# Patient Record
Sex: FEMALE | Race: WHITE | NOT HISPANIC OR LATINO
[De-identification: names, ages, dates, MRNs, and addresses within clinical notes are randomized per-mention and may not be internally consistent; named-entity substitution may affect disease eponyms.]

---

## 2017-06-05 ENCOUNTER — APPOINTMENT (OUTPATIENT)
Dept: GYNECOLOGIC ONCOLOGY | Facility: CLINIC | Age: 52
End: 2017-06-05

## 2017-06-07 LAB — HPV HIGH+LOW RISK DNA PNL CVX: NEGATIVE

## 2017-06-14 LAB — PAP TEST: NORMAL

## 2017-06-22 ENCOUNTER — RESULT REVIEW (OUTPATIENT)
Age: 52
End: 2017-06-22

## 2017-07-12 ENCOUNTER — APPOINTMENT (OUTPATIENT)
Dept: GYNECOLOGIC ONCOLOGY | Facility: CLINIC | Age: 52
End: 2017-07-12

## 2017-07-12 VITALS
DIASTOLIC BLOOD PRESSURE: 82 MMHG | HEART RATE: 79 BPM | SYSTOLIC BLOOD PRESSURE: 120 MMHG | BODY MASS INDEX: 23.49 KG/M2 | WEIGHT: 155 LBS | RESPIRATION RATE: 16 BRPM | HEIGHT: 68 IN

## 2017-08-01 LAB — LH SURGICAL PATHOLOGY FINAL REPORT: NORMAL

## 2017-12-11 ENCOUNTER — APPOINTMENT (OUTPATIENT)
Dept: GYNECOLOGIC ONCOLOGY | Facility: CLINIC | Age: 52
End: 2017-12-11
Payer: COMMERCIAL

## 2017-12-11 DIAGNOSIS — K21.9 GASTRO-ESOPHAGEAL REFLUX DISEASE W/OUT ESOPHAGITIS: ICD-10-CM

## 2017-12-11 DIAGNOSIS — Z80.9 FAMILY HISTORY OF MALIGNANT NEOPLASM, UNSPECIFIED: ICD-10-CM

## 2017-12-11 DIAGNOSIS — Z85.850 PERSONAL HISTORY OF MALIGNANT NEOPLASM OF THYROID: ICD-10-CM

## 2017-12-11 DIAGNOSIS — K90.0 CELIAC DISEASE: ICD-10-CM

## 2017-12-11 PROCEDURE — 99214 OFFICE O/P EST MOD 30 MIN: CPT

## 2017-12-13 LAB — HPV HIGH+LOW RISK DNA PNL CVX: NOT DETECTED

## 2017-12-19 LAB — PAP TEST: NORMAL

## 2018-06-11 ENCOUNTER — APPOINTMENT (OUTPATIENT)
Dept: GYNECOLOGIC ONCOLOGY | Facility: CLINIC | Age: 53
End: 2018-06-11
Payer: COMMERCIAL

## 2018-06-11 VITALS
BODY MASS INDEX: 24.44 KG/M2 | SYSTOLIC BLOOD PRESSURE: 122 MMHG | WEIGHT: 161.25 LBS | HEIGHT: 68 IN | HEART RATE: 75 BPM | DIASTOLIC BLOOD PRESSURE: 83 MMHG | OXYGEN SATURATION: 98 %

## 2018-06-11 DIAGNOSIS — R87.618 OTHER ABNORMAL CYTOLOGICAL FINDINGS ON SPECIMENS FROM CERVIX UTERI: ICD-10-CM

## 2018-06-11 PROCEDURE — 99214 OFFICE O/P EST MOD 30 MIN: CPT

## 2018-06-11 PROCEDURE — 88142 CYTOPATH C/V THIN LAYER: CPT

## 2018-06-13 LAB — HPV HIGH+LOW RISK DNA PNL CVX: NOT DETECTED

## 2018-06-21 LAB — PAP TEST: NORMAL

## 2018-06-25 ENCOUNTER — TRANSCRIPTION ENCOUNTER (OUTPATIENT)
Age: 53
End: 2018-06-25

## 2018-07-18 ENCOUNTER — APPOINTMENT (OUTPATIENT)
Dept: GYNECOLOGIC ONCOLOGY | Facility: CLINIC | Age: 53
End: 2018-07-18
Payer: COMMERCIAL

## 2018-07-18 VITALS
DIASTOLIC BLOOD PRESSURE: 77 MMHG | OXYGEN SATURATION: 96 % | WEIGHT: 158 LBS | HEIGHT: 68 IN | BODY MASS INDEX: 23.95 KG/M2 | HEART RATE: 85 BPM | SYSTOLIC BLOOD PRESSURE: 117 MMHG

## 2018-07-18 PROCEDURE — 58100 BIOPSY OF UTERUS LINING: CPT

## 2018-07-18 PROCEDURE — 99214 OFFICE O/P EST MOD 30 MIN: CPT | Mod: 25

## 2018-07-23 LAB — LH SURGICAL PATHOLOGY FINAL REPORT: NORMAL

## 2018-12-03 ENCOUNTER — APPOINTMENT (OUTPATIENT)
Dept: GYNECOLOGIC ONCOLOGY | Facility: CLINIC | Age: 53
End: 2018-12-03
Payer: COMMERCIAL

## 2018-12-03 VITALS
SYSTOLIC BLOOD PRESSURE: 108 MMHG | HEART RATE: 86 BPM | BODY MASS INDEX: 24.25 KG/M2 | WEIGHT: 160 LBS | OXYGEN SATURATION: 97 % | HEIGHT: 68 IN | DIASTOLIC BLOOD PRESSURE: 72 MMHG

## 2018-12-03 PROCEDURE — 99214 OFFICE O/P EST MOD 30 MIN: CPT

## 2019-03-12 ENCOUNTER — APPOINTMENT (OUTPATIENT)
Dept: OPHTHALMOLOGY | Facility: CLINIC | Age: 54
End: 2019-03-12
Payer: COMMERCIAL

## 2019-03-12 DIAGNOSIS — H35.413 LATTICE DEGENERATION OF RETINA, BILATERAL: ICD-10-CM

## 2019-03-12 PROCEDURE — 92014 COMPRE OPH EXAM EST PT 1/>: CPT

## 2019-04-26 ENCOUNTER — APPOINTMENT (OUTPATIENT)
Dept: OPHTHALMOLOGY | Facility: CLINIC | Age: 54
End: 2019-04-26

## 2019-05-30 NOTE — PAST MEDICAL HISTORY
[Perimenopausal] : The patient is perimenopausal [Menarche Age ____] : age at menarche was [unfilled] [Definite ___ (Date)] : the last menstrual period was [unfilled] [Total Preg ___] : G[unfilled] [Live Births ___] : P[unfilled]  [Full Term ___] : Full Term: [unfilled] [Abortions ___] : Abortions:[unfilled] [Living ___] : Living: [unfilled]

## 2019-05-30 NOTE — PHYSICAL EXAM
[Abnormal] : Examination of breasts: Abnormal [] : implants [No Masses] : no breast masses were palpable [Normal] : Recto-Vaginal Exam: Normal [Fully active, able to carry on all pre-disease performance without restriction] : Status 0 - Fully active, able to carry on all pre-disease performance without restriction

## 2019-06-03 ENCOUNTER — APPOINTMENT (OUTPATIENT)
Dept: GYNECOLOGIC ONCOLOGY | Facility: CLINIC | Age: 54
End: 2019-06-03
Payer: COMMERCIAL

## 2019-06-03 VITALS
SYSTOLIC BLOOD PRESSURE: 112 MMHG | HEART RATE: 74 BPM | WEIGHT: 155.13 LBS | DIASTOLIC BLOOD PRESSURE: 74 MMHG | BODY MASS INDEX: 23.51 KG/M2 | OXYGEN SATURATION: 96 % | HEIGHT: 68 IN

## 2019-06-03 DIAGNOSIS — K64.9 UNSPECIFIED HEMORRHOIDS: ICD-10-CM

## 2019-06-03 PROCEDURE — 99214 OFFICE O/P EST MOD 30 MIN: CPT

## 2019-06-05 LAB — HPV HIGH+LOW RISK DNA PNL CVX: NOT DETECTED

## 2019-06-14 ENCOUNTER — APPOINTMENT (OUTPATIENT)
Dept: INFECTIOUS DISEASE | Facility: CLINIC | Age: 54
End: 2019-06-14
Payer: COMMERCIAL

## 2019-06-14 VITALS
WEIGHT: 155 LBS | OXYGEN SATURATION: 99 % | SYSTOLIC BLOOD PRESSURE: 131 MMHG | DIASTOLIC BLOOD PRESSURE: 83 MMHG | HEART RATE: 69 BPM | HEIGHT: 68 IN | BODY MASS INDEX: 23.49 KG/M2 | TEMPERATURE: 97.9 F | RESPIRATION RATE: 14 BRPM

## 2019-06-14 DIAGNOSIS — Z20.820 CONTACT WITH AND (SUSPECTED) EXPOSURE TO VARICELLA: ICD-10-CM

## 2019-06-14 PROCEDURE — 99203 OFFICE O/P NEW LOW 30 MIN: CPT

## 2019-06-14 RX ORDER — PRAMOXINE HYDROCHLORIDE HYDROCORTISONE ACETATE 100; 100 MG/10G; MG/10G
1-1 AEROSOL, FOAM TOPICAL
Qty: 1 | Refills: 6 | Status: COMPLETED | COMMUNITY
Start: 2017-06-05 | End: 2019-06-14

## 2019-06-14 NOTE — REVIEW OF SYSTEMS
[Fever] : no fever [Eye Pain] : no eye pain [Nasal Discharge] : no nasal discharge [Chills] : no chills [Sore Throat] : no sore throat [Chest Pain] : no chest pain [Shortness Of Breath] : no shortness of breath [Palpitations] : no palpitations [Sputum] : not coughing up ~M sputum [Dysuria] : no dysuria [Cough] : no cough [FreeTextEntry7] : Recent diarrheal illness due to Cryptosporidium - still with lactose intolerance below baseline. [Skin Lesions] : no skin lesions

## 2019-06-14 NOTE — HISTORY OF PRESENT ILLNESS
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  She came to discuss zoster vaccine.  Her 28 yo MA had zoster on her arm - she is afraid that she will contract zoster, though she understands that the pathogenesis involves reactivation.  She has had a variety of autoimmune processes.  She is under the care of Dr. Chin Agarwal ([facility] [place]), an expert in celiac disease, who believes there is a link between Hashimotos/papillary CA of thyroid, breast CA and celiac disease.  She is concerned about timing of vaccine and current lack of knowledge regarding duration of immunity/how to maintain immunity.  She had varicella at age 6 months, had varicella titers when tested remotely.

## 2019-06-14 NOTE — ASSESSMENT
[FreeTextEntry1] : 52 yo F seen for counseling regarding Shingrix.  We reviewed the nature of the vaccine, incidence of zoster at varying ages, vaccine efficacy and side effects.  She will defer at this time and discuss with Dr. Agarwal.

## 2019-07-17 NOTE — DISCUSSION/SUMMARY
[FreeTextEntry1] : Stable thickened endometrium without bleeding\par Follow up in 6 months\par Pap/HPV done today

## 2019-07-17 NOTE — HISTORY OF PRESENT ILLNESS
[FreeTextEntry1] : Problem\par 1) Breast Cancer\par 2) HPV+\par 3) BRCA negative\par 4)THickened Endometrium\par \par Previous Therapy\par 1)Bilateral Mastectomy\par 2) Tamoxifen\par 3) pelvic Ultrasound 8/2016\par    a) normal ovaries, stable intramural fibroids\par 4) Pelvic ultrasound 5/26/17\par    a) uterus with 2 small fibroids, unremarkable endometrium and bilateral adnexa\par 5) pap 6/2017 NILM but with endometrial cells present\par 6) EMB 7/12/17 weakly proliferative endometrium\par 7)Pap 12/11/2017\par    a)NILM, HPV/HR Negative\par 8)Pap 6/11/18\par    a)NILM,HPV/HR Neg\par 9)TVUS 6/22/18\par    a)Small amount of fluid in endometrial cavity\par    b)Thickened endometrium of 7mm\par    c)Left adnexal simple ovoid cyst of 4x2x3.5cm\par 10) US 11/27/18\par    a)Endometrium 10mm\par    b)Fibroid stable of 2.9cm\par    c)Left ovarian cyst no longer seen\par \par Patient presents today for a follow up.   \par \par Health Maintenance\par Bone Density at [facility]\par Pap 6/2018 Neg

## 2019-07-17 NOTE — REVIEW OF SYSTEMS
[Negative] : Musculoskeletal [Dyspareunia] : dyspareunia [Normal Sexual Function] : normal sexual function [FreeTextEntry4] : pain with rorgasm in right lower quadrant, decreased libido on tamoxifen. 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

## 2019-11-04 VITALS
TEMPERATURE: 98 F | WEIGHT: 154.54 LBS | HEIGHT: 68 IN | OXYGEN SATURATION: 98 % | RESPIRATION RATE: 16 BRPM | SYSTOLIC BLOOD PRESSURE: 100 MMHG | HEART RATE: 86 BPM | DIASTOLIC BLOOD PRESSURE: 67 MMHG

## 2019-11-04 NOTE — ASU PREOP CHECKLIST - 1.
pt refused [facility] pregnancy testing and T&S blood draw. pt was informed of the importance of testing however verbalized understanding but does not agree with [facility] policy and is stiff refusing.  gyn team notified, surgeon aware- no further orders given.

## 2019-11-04 NOTE — ASU PATIENT PROFILE, ADULT - PSH
History of mastectomy, bilateral    History of thyroidectomy History of mastectomy, bilateral    History of nasal surgery    History of thyroidectomy

## 2019-11-04 NOTE — ASU PATIENT PROFILE, ADULT - PMH
Abnormal uterine and vaginal bleeding Abnormal uterine and vaginal bleeding    Breast CA    Celiac disease    Thyroid ca

## 2019-11-04 NOTE — ASU PATIENT PROFILE, ADULT - PROVIDER NOTIFICATION
"""Annual Type 2 Diabetic eye exam with dilation. No diabetic retinopathy found. Recommend annual dilated examinations. Patient instructed to call office immediately if sudden changes in vision occur. Emphasized importance of good blood glucose control. Summary of care provided to the physician managing the ongoing diabetes care. """ Declines

## 2019-11-05 ENCOUNTER — APPOINTMENT (OUTPATIENT)
Dept: GYNECOLOGIC ONCOLOGY | Facility: HOSPITAL | Age: 54
End: 2019-11-05

## 2019-11-05 ENCOUNTER — OUTPATIENT (OUTPATIENT)
Dept: INPATIENT UNIT | Facility: HOSPITAL | Age: 54
LOS: 1 days | Discharge: HOME | End: 2019-11-05
Payer: COMMERCIAL

## 2019-11-05 ENCOUNTER — RESULT REVIEW (OUTPATIENT)
Age: 54
End: 2019-11-05

## 2019-11-05 VITALS
HEART RATE: 92 BPM | OXYGEN SATURATION: 83 % | DIASTOLIC BLOOD PRESSURE: 79 MMHG | RESPIRATION RATE: 20 BRPM | SYSTOLIC BLOOD PRESSURE: 119 MMHG | TEMPERATURE: 97 F

## 2019-11-05 DIAGNOSIS — Z90.13 ACQUIRED ABSENCE OF BILATERAL BREASTS AND NIPPLES: Chronic | ICD-10-CM

## 2019-11-05 DIAGNOSIS — Z98.890 OTHER SPECIFIED POSTPROCEDURAL STATES: Chronic | ICD-10-CM

## 2019-11-05 DIAGNOSIS — Z90.09 ACQUIRED ABSENCE OF OTHER PART OF HEAD AND NECK: Chronic | ICD-10-CM

## 2019-11-05 PROCEDURE — 58558 HYSTEROSCOPY BIOPSY: CPT

## 2019-11-05 PROCEDURE — 88305 TISSUE EXAM BY PATHOLOGIST: CPT

## 2019-11-05 RX ORDER — ONDANSETRON 8 MG/1
4 TABLET, FILM COATED ORAL ONCE
Refills: 0 | Status: DISCONTINUED | OUTPATIENT
Start: 2019-11-05 | End: 2019-11-05

## 2019-11-05 RX ORDER — METOCLOPRAMIDE HCL 10 MG
10 TABLET ORAL EVERY 6 HOURS
Refills: 0 | Status: DISCONTINUED | OUTPATIENT
Start: 2019-11-05 | End: 2019-11-05

## 2019-11-05 RX ORDER — KETOROLAC TROMETHAMINE 30 MG/ML
30 SYRINGE (ML) INJECTION ONCE
Refills: 0 | Status: DISCONTINUED | OUTPATIENT
Start: 2019-11-05 | End: 2019-11-05

## 2019-11-05 NOTE — BRIEF OPERATIVE NOTE - NSICDXBRIEFPROCEDURE_GEN_ALL_CORE_FT
PROCEDURES:  Dilation and curettage, uterus 05-Nov-2019 18:14:59  Coleen Ravi  Hysteroscopy 05-Nov-2019 18:14:50  Coleen Ravi

## 2019-11-06 LAB — SURGICAL PATHOLOGY STUDY: SIGNIFICANT CHANGE UP

## 2019-11-14 PROBLEM — C73 MALIGNANT NEOPLASM OF THYROID GLAND: Chronic | Status: ACTIVE | Noted: 2019-11-04

## 2019-11-14 PROBLEM — K90.0 CELIAC DISEASE: Chronic | Status: ACTIVE | Noted: 2019-11-04

## 2019-11-14 PROBLEM — C50.919 MALIGNANT NEOPLASM OF UNSPECIFIED SITE OF UNSPECIFIED FEMALE BREAST: Chronic | Status: ACTIVE | Noted: 2019-11-04

## 2019-11-14 PROBLEM — N93.9 ABNORMAL UTERINE AND VAGINAL BLEEDING, UNSPECIFIED: Chronic | Status: ACTIVE | Noted: 2019-11-04

## 2019-12-04 ENCOUNTER — APPOINTMENT (OUTPATIENT)
Dept: GYNECOLOGIC ONCOLOGY | Facility: CLINIC | Age: 54
End: 2019-12-04

## 2020-02-01 ENCOUNTER — TRANSCRIPTION ENCOUNTER (OUTPATIENT)
Age: 55
End: 2020-02-01

## 2020-02-01 NOTE — PHYSICAL EXAM
[] : implants [Abnormal] : Examination of breasts: Abnormal [No Masses] : no breast masses were palpable [Normal] : Recto-Vaginal Exam: Normal [Fully active, able to carry on all pre-disease performance without restriction] : Status 0 - Fully active, able to carry on all pre-disease performance without restriction

## 2020-02-01 NOTE — PAST MEDICAL HISTORY
[Menarche Age ____] : age at menarche was [unfilled] [Perimenopausal] : The patient is perimenopausal [Definite ___ (Date)] : the last menstrual period was [unfilled] [Total Preg ___] : G[unfilled] [Live Births ___] : P[unfilled]  [Full Term ___] : Full Term: [unfilled] [Abortions ___] : Abortions:[unfilled] [Living ___] : Living: [unfilled]

## 2020-02-03 ENCOUNTER — APPOINTMENT (OUTPATIENT)
Dept: GYNECOLOGIC ONCOLOGY | Facility: CLINIC | Age: 55
End: 2020-02-03
Payer: COMMERCIAL

## 2020-02-03 VITALS
HEIGHT: 68 IN | HEART RATE: 69 BPM | OXYGEN SATURATION: 95 % | BODY MASS INDEX: 23.34 KG/M2 | SYSTOLIC BLOOD PRESSURE: 128 MMHG | DIASTOLIC BLOOD PRESSURE: 81 MMHG | WEIGHT: 154 LBS

## 2020-02-03 PROCEDURE — 99214 OFFICE O/P EST MOD 30 MIN: CPT

## 2020-02-04 NOTE — HISTORY OF PRESENT ILLNESS
[FreeTextEntry1] : Problem\par 1) Breast Cancer\par 2) HPV+\par 3) BRCA negative\par 4)THickened Endometrium\par \par Previous Therapy\par 1)Bilateral Mastectomy\par 2) Tamoxifen\par 3) pelvic Ultrasound 8/2016\par    a) normal ovaries, stable intramural fibroids\par 4) Pelvic ultrasound 5/26/17\par    a) uterus with 2 small fibroids, unremarkable endometrium and bilateral adnexa\par 5) pap 6/2017 NILM but with endometrial cells present\par 6) EMB 7/12/17 weakly proliferative endometrium\par 7)Pap 12/11/2017\par    a)NILM, HPV/HR Negative\par 8)Pap 6/11/18\par    a)NILM,HPV/HR Neg\par 9)TVUS 6/22/18\par    a)Small amount of fluid in endometrial cavity\par    b)Thickened endometrium of 7mm\par    c)Left adnexal simple ovoid cyst of 4x2x3.5cm\par 10) US 11/27/18\par    a)Endometrium 10mm\par    b)Fibroid stable of 2.9cm\par    c)Left ovarian cyst no longer seen\par 11) US 11/2019 thickened endometrium - done for PMB\par 12) D&C 12/11/19\par    a) benign endometrial and endocervical polyps, reviewed at [facility] at patient request\par 13) Pelvic US 1/2020\par     a) Endometrium 5mm, Uterus 10/1cm - small 1.6 and 0.9cm fibroids\par \par Patient here for follow up. Feeling well, no further bleeding. \par \par Health Maintenance\par Bone Density at [facility]\par Pap 6/2018 Neg

## 2020-02-04 NOTE — DISCUSSION/SUMMARY
[FreeTextEntry1] : D&C with benign endometrial polyp\par No bleeding and normal US now\par Follow up in 6months

## 2020-02-13 NOTE — ASU PREOP CHECKLIST - TO WHOM
Detail Level: Generalized Dapsone Counseling: I discussed with the patient the risks of dapsone including but not limited to hemolytic anemia, agranulocytosis, rashes, methemoglobinemia, kidney failure, peripheral neuropathy, headaches, GI upset, and liver toxicity.  Patients who start dapsone require monitoring including baseline LFTs and weekly CBCs for the first month, then every month thereafter.  The patient verbalized understanding of the proper use and possible adverse effects of dapsone.  All of the patient's questions and concerns were addressed. Isotretinoin Counseling: Patient should get monthly blood tests, not donate blood, not drive at night if vision affected, not share medication, and not undergo elective surgery for 6 months after tx completed. Side effects reviewed, pt to contact office should one occur. Tazorac Counseling:  Patient advised that medication is irritating and drying.  Patient may need to apply sparingly and wash off after an hour before eventually leaving it on overnight.  The patient verbalized understanding of the proper use and possible adverse effects of tazorac.  All of the patient's questions and concerns were addressed. Spironolactone Counseling: Patient advised regarding risks of diarrhea, abdominal pain, hyperkalemia, birth defects (for female patients), liver toxicity and renal toxicity. The patient may need blood work to monitor liver and kidney function and potassium levels while on therapy. The patient verbalized understanding of the proper use and possible adverse effects of spironolactone.  All of the patient's questions and concerns were addressed. Erythromycin Pregnancy And Lactation Text: This medication is Pregnancy Category B and is considered safe during pregnancy. It is also excreted in breast milk. Spironolactone Pregnancy And Lactation Text: This medication can cause feminization of the male fetus and should be avoided during pregnancy. The active metabolite is also found in breast milk. Minocycline Pregnancy And Lactation Text: This medication is Pregnancy Category D and not consider safe during pregnancy. It is also excreted in breast milk. Doxycycline Pregnancy And Lactation Text: This medication is Pregnancy Category D and not consider safe during pregnancy. It is also excreted in breast milk but is considered safe for shorter treatment courses. Benzoyl Peroxide Pregnancy And Lactation Text: This medication is Pregnancy Category C. It is unknown if benzoyl peroxide is excreted in breast milk. Include Pregnancy/Lactation Warning?: No Doxycycline Counseling:  Patient counseled regarding possible photosensitivity and increased risk for sunburn.  Patient instructed to avoid sunlight, if possible.  When exposed to sunlight, patients should wear protective clothing, sunglasses, and sunscreen.  The patient was instructed to call the office immediately if the following severe adverse effects occur:  hearing changes, easy bruising/bleeding, severe headache, or vision changes.  The patient verbalized understanding of the proper use and possible adverse effects of doxycycline.  All of the patient's questions and concerns were addressed. Isotretinoin Pregnancy And Lactation Text: This medication is Pregnancy Category X and is considered extremely dangerous during pregnancy. It is unknown if it is excreted in breast milk. High Dose Vitamin A Pregnancy And Lactation Text: High dose vitamin A therapy is contraindicated during pregnancy and breast feeding. Bactrim Counseling:  I discussed with the patient the risks of sulfa antibiotics including but not limited to GI upset, allergic reaction, drug rash, diarrhea, dizziness, photosensitivity, and yeast infections.  Rarely, more serious reactions can occur including but not limited to aplastic anemia, agranulocytosis, methemoglobinemia, blood dyscrasias, liver or kidney failure, lung infiltrates or desquamative/blistering drug rashes. Dapsone Pregnancy And Lactation Text: This medication is Pregnancy Category C and is not considered safe during pregnancy or breast feeding. Birth Control Pills Pregnancy And Lactation Text: This medication should be avoided if pregnant and for the first 30 days post-partum. Topical Sulfur Applications Counseling: Topical Sulfur Counseling: Patient counseled that this medication may cause skin irritation or allergic reactions.  In the event of skin irritation, the patient was advised to reduce the amount of the drug applied or use it less frequently.   The patient verbalized understanding of the proper use and possible adverse effects of topical sulfur application.  All of the patient's questions and concerns were addressed. Azithromycin Pregnancy And Lactation Text: This medication is considered safe during pregnancy and is also secreted in breast milk. Detail Level: Detailed Benzoyl Peroxide Counseling: Patient counseled that medicine may cause skin irritation and bleach clothing.  In the event of skin irritation, the patient was advised to reduce the amount of the drug applied or use it less frequently.   The patient verbalized understanding of the proper use and possible adverse effects of benzoyl peroxide.  All of the patient's questions and concerns were addressed. Detail Level: Zone Topical Retinoid counseling:  Patient advised to apply a pea-sized amount only at bedtime and wait 30 minutes after washing their face before applying.  If too drying, patient may add a non-comedogenic moisturizer. The patient verbalized understanding of the proper use and possible adverse effects of retinoids.  All of the patient's questions and concerns were addressed. Tetracycline Counseling: Patient counseled regarding possible photosensitivity and increased risk for sunburn.  Patient instructed to avoid sunlight, if possible.  When exposed to sunlight, patients should wear protective clothing, sunglasses, and sunscreen.  The patient was instructed to call the office immediately if the following severe adverse effects occur:  hearing changes, easy bruising/bleeding, severe headache, or vision changes.  The patient verbalized understanding of the proper use and possible adverse effects of tetracycline.  All of the patient's questions and concerns were addressed. Patient understands to avoid pregnancy while on therapy due to potential birth defects. Topical Retinoid Pregnancy And Lactation Text: This medication is Pregnancy Category C. It is unknown if this medication is excreted in breast milk. Bactrim Pregnancy And Lactation Text: This medication is Pregnancy Category D and is known to cause fetal risk.  It is also excreted in breast milk. High Dose Vitamin A Counseling: Side effects reviewed, pt to contact office should one occur. Topical Sulfur Applications Pregnancy And Lactation Text: This medication is Pregnancy Category C and has an unknown safety profile during pregnancy. It is unknown if this topical medication is excreted in breast milk. Birth Control Pills Counseling: Birth Control Pill Counseling: I discussed with the patient the potential side effects of OCPs including but not limited to increased risk of stroke, heart attack, thrombophlebitis, deep venous thrombosis, hepatic adenomas, breast changes, GI upset, headaches, and depression.  The patient verbalized understanding of the proper use and possible adverse effects of OCPs. All of the patient's questions and concerns were addressed. Erythromycin Counseling:  I discussed with the patient the risks of erythromycin including but not limited to GI upset, allergic reaction, drug rash, diarrhea, increase in liver enzymes, and yeast infections. Topical Clindamycin Pregnancy And Lactation Text: This medication is Pregnancy Category B and is considered safe during pregnancy. It is unknown if it is excreted in breast milk. Tazorac Pregnancy And Lactation Text: This medication is not safe during pregnancy. It is unknown if this medication is excreted in breast milk. Detail Level: Simple Minocycline Counseling: Patient advised regarding possible photosensitivity and discoloration of the teeth, skin, lips, tongue and gums.  Patient instructed to avoid sunlight, if possible.  When exposed to sunlight, patients should wear protective clothing, sunglasses, and sunscreen.  The patient was instructed to call the office immediately if the following severe adverse effects occur:  hearing changes, easy bruising/bleeding, severe headache, or vision changes.  The patient verbalized understanding of the proper use and possible adverse effects of minocycline.  All of the patient's questions and concerns were addressed. Topical Clindamycin Counseling: Patient counseled that this medication may cause skin irritation or allergic reactions.  In the event of skin irritation, the patient was advised to reduce the amount of the drug applied or use it less frequently.   The patient verbalized understanding of the proper use and possible adverse effects of clindamycin.  All of the patient's questions and concerns were addressed. Azithromycin Counseling:  I discussed with the patient the risks of azithromycin including but not limited to GI upset, allergic reaction, drug rash, diarrhea, and yeast infections. OR RN

## 2020-05-30 ENCOUNTER — MESSAGE (OUTPATIENT)
Age: 55
End: 2020-05-30

## 2020-05-30 ENCOUNTER — APPOINTMENT (OUTPATIENT)
Dept: DISASTER EMERGENCY | Facility: HOSPITAL | Age: 55
End: 2020-05-30

## 2020-08-10 ENCOUNTER — APPOINTMENT (OUTPATIENT)
Dept: GYNECOLOGIC ONCOLOGY | Facility: CLINIC | Age: 55
End: 2020-08-10

## 2020-08-10 ENCOUNTER — APPOINTMENT (OUTPATIENT)
Dept: GYNECOLOGIC ONCOLOGY | Facility: CLINIC | Age: 55
End: 2020-08-10
Payer: COMMERCIAL

## 2020-08-10 PROCEDURE — 99214 OFFICE O/P EST MOD 30 MIN: CPT | Mod: 95

## 2020-08-10 NOTE — HISTORY OF PRESENT ILLNESS
[FreeTextEntry1] : Problem\par 1) Breast Cancer\par 2) HPV+\par 3) BRCA negative\par 4)THickened Endometrium\par \par Previous Therapy\par 1)Bilateral Mastectomy\par 2) Tamoxifen\par 3) pelvic Ultrasound 8/2016\par    a) normal ovaries, stable intramural fibroids\par 4) Pelvic ultrasound 5/26/17\par    a) uterus with 2 small fibroids, unremarkable endometrium and bilateral adnexa\par 5) pap 6/2017 NILM but with endometrial cells present\par 6) EMB 7/12/17 weakly proliferative endometrium\par 7)Pap 12/11/2017\par    a)NILM, HPV/HR Negative\par 8)Pap 6/11/18\par    a)NILM,HPV/HR Neg\par 9)TVUS 6/22/18\par    a)Small amount of fluid in endometrial cavity\par    b)Thickened endometrium of 7mm\par    c)Left adnexal simple ovoid cyst of 4x2x3.5cm\par 10) US 11/27/18\par    a)Endometrium 10mm\par    b)Fibroid stable of 2.9cm\par    c)Left ovarian cyst no longer seen\par 11) US 11/2019 thickened endometrium - done for PMB\par 12) D&C 12/11/19\par    a) benign endometrial and endocervical polyps, reviewed at [facility] at patient request\par 13) Pelvic US 1/2020\par     a) Endometrium 5mm, Uterus 10/1cm - small 1.6 and 0.9cm fibroids\par 14) Pelvic US 8/3/2020\par    a) US 9.7cm, stable small intramural fibroids. endometrium is mildly thickened 9mm with small submucosal cysts. Small simple  1.7cm R ovarian cyst\par \par Patient here for follow up, done via telehealth due to COVID19 restrictions. Telehealth consent obtained. . Feeling well, no further bleeding. \par \par Health Maintenance\par Bone Density at [facility] [place]\par Pap 6/2018 Neg

## 2020-08-10 NOTE — DISCUSSION/SUMMARY
[FreeTextEntry1] : Thickened endometrium with subendometriotic cysts\par patient not bleeding, no longer on Tamoxifen\par Discussed need for EMB, will follow up Monday 8/17

## 2020-08-10 NOTE — REVIEW OF SYSTEMS
[FreeTextEntry4] : pain with rorgasm in right lower quadrant, decreased libido on tamoxifen. 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

## 2020-08-14 NOTE — PAST MEDICAL HISTORY
[Perimenopausal] : The patient is perimenopausal [Menarche Age ____] : age at menarche was [unfilled] [Total Preg ___] : G[unfilled] [Definite ___ (Date)] : the last menstrual period was [unfilled] [Live Births ___] : P[unfilled]  [Abortions ___] : Abortions:[unfilled] [Full Term ___] : Full Term: [unfilled] [Living ___] : Living: [unfilled]

## 2020-08-17 ENCOUNTER — APPOINTMENT (OUTPATIENT)
Dept: GYNECOLOGIC ONCOLOGY | Facility: CLINIC | Age: 55
End: 2020-08-17
Payer: COMMERCIAL

## 2020-08-17 VITALS
BODY MASS INDEX: 23.04 KG/M2 | DIASTOLIC BLOOD PRESSURE: 82 MMHG | SYSTOLIC BLOOD PRESSURE: 124 MMHG | HEART RATE: 70 BPM | OXYGEN SATURATION: 96 % | WEIGHT: 152 LBS | HEIGHT: 68 IN

## 2020-08-17 DIAGNOSIS — N95.1 MENOPAUSAL AND FEMALE CLIMACTERIC STATES: ICD-10-CM

## 2020-08-17 PROCEDURE — 58100 BIOPSY OF UTERUS LINING: CPT

## 2020-08-17 NOTE — DISCUSSION/SUMMARY
[FreeTextEntry1] : EMB performed today\par will call with results to determine plan of care\par Repeat US in 6 months

## 2020-08-17 NOTE — HISTORY OF PRESENT ILLNESS
[FreeTextEntry1] : Problem\par 1) Breast Cancer\par 2) HPV+\par 3) BRCA negative\par 4)THickened Endometrium\par \par Previous Therapy\par 1)Bilateral Mastectomy\par 2) Tamoxifen\par 3) pelvic Ultrasound 8/2016\par    a) normal ovaries, stable intramural fibroids\par 4) Pelvic ultrasound 5/26/17\par    a) uterus with 2 small fibroids, unremarkable endometrium and bilateral adnexa\par 5) pap 6/2017 NILM but with endometrial cells present\par 6) EMB 7/12/17 weakly proliferative endometrium\par 7)Pap 12/11/2017\par    a)NILM, HPV/HR Negative\par 8)Pap 6/11/18\par    a)NILM,HPV/HR Neg\par 9)TVUS 6/22/18\par    a)Small amount of fluid in endometrial cavity\par    b)Thickened endometrium of 7mm\par    c)Left adnexal simple ovoid cyst of 4x2x3.5cm\par 10) US 11/27/18\par    a)Endometrium 10mm\par    b)Fibroid stable of 2.9cm\par    c)Left ovarian cyst no longer seen\par 11) US 11/2019 thickened endometrium - done for PMB\par 12) D&C 12/11/19\par    a) benign endometrial and endocervical polyps, reviewed at [facility] at patient request\par 13) Pelvic US 1/2020\par     a) Endometrium 5mm, Uterus 10/1cm - small 1.6 and 0.9cm fibroids\par 14) Pelvic US 8/3/2020\par    a) US 9.7cm, stable small intramural fibroids. endometrium is mildly thickened 9mm with small submucosal cysts. Small simple  1.7cm R ovarian cyst\par \par Here today for EMB\par \par Health Maintenance\par Bone Density at [facility] [place]\par Pap 6/2018 Neg

## 2020-08-17 NOTE — REVIEW OF SYSTEMS
[Negative] : Musculoskeletal [Normal Sexual Function] : normal sexual function [Dyspareunia] : dyspareunia [FreeTextEntry4] : pain with rorgasm in right lower quadrant, decreased libido on tamoxifen. 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

## 2020-08-17 NOTE — PROCEDURE
[Endometrial Biopsy] : an endometrial biopsy [Patient] : the patient [Thickened Endometrium] : thickened endometrium [None] : none [Verbal Consent] : verbal consent was obtained prior to the procedure and is detailed in the patient's record [No Complications] : none [Yes] : the specimen was sent to pathology [Tolerated Well] : the patient tolerated the procedure well [FreeTextEntry1] : Uterus sounded to 7cm, adequate tissue obtained

## 2021-04-19 ENCOUNTER — NON-APPOINTMENT (OUTPATIENT)
Age: 56
End: 2021-04-19

## 2021-04-19 ENCOUNTER — APPOINTMENT (OUTPATIENT)
Dept: GYNECOLOGIC ONCOLOGY | Facility: CLINIC | Age: 56
End: 2021-04-19
Payer: COMMERCIAL

## 2021-04-19 VITALS
OXYGEN SATURATION: 99 % | TEMPERATURE: 97.8 F | DIASTOLIC BLOOD PRESSURE: 85 MMHG | BODY MASS INDEX: 22.28 KG/M2 | HEART RATE: 60 BPM | WEIGHT: 147 LBS | HEIGHT: 68 IN | SYSTOLIC BLOOD PRESSURE: 145 MMHG

## 2021-04-19 PROCEDURE — 99072 ADDL SUPL MATRL&STAF TM PHE: CPT

## 2021-04-19 PROCEDURE — 99214 OFFICE O/P EST MOD 30 MIN: CPT

## 2021-04-19 NOTE — PROCEDURE
[Endometrial Biopsy] : an endometrial biopsy [Thickened Endometrium] : thickened endometrium [Patient] : the patient [Verbal Consent] : verbal consent was obtained prior to the procedure and is detailed in the patient's record [None] : none [Yes] : the specimen was sent to pathology [No Complications] : none [Tolerated Well] : the patient tolerated the procedure well [FreeTextEntry1] : Uterus sounded to 7cm, adequate tissue obtained

## 2021-04-19 NOTE — HISTORY OF PRESENT ILLNESS
[FreeTextEntry1] : Problem\par 1) Breast Cancer\par 2) HPV+\par 3) BRCA negative\par 4)THickened Endometrium\par \par Previous Therapy\par 1)Bilateral Mastectomy\par 2) Tamoxifen\par 3) pelvic Ultrasound 8/2016\par    a) normal ovaries, stable intramural fibroids\par 4) Pelvic ultrasound 5/26/17\par    a) uterus with 2 small fibroids, unremarkable endometrium and bilateral adnexa\par 5) pap 6/2017 NILM but with endometrial cells present\par 6) EMB 7/12/17 weakly proliferative endometrium\par 7)Pap 12/11/2017\par    a)NILM, HPV/HR Negative\par 8)Pap 6/11/18\par    a)NILM,HPV/HR Neg\par 9)TVUS 6/22/18\par    a)Small amount of fluid in endometrial cavity\par    b)Thickened endometrium of 7mm\par    c)Left adnexal simple ovoid cyst of 4x2x3.5cm\par 10) US 11/27/18\par    a)Endometrium 10mm\par    b)Fibroid stable of 2.9cm\par    c)Left ovarian cyst no longer seen\par 11) US 11/2019 thickened endometrium - done for PMB\par 12) D&C 12/11/19\par    a) benign endometrial and endocervical polyps, reviewed at [facility] at patient request\par 13) Pelvic US 1/2020\par     a) Endometrium 5mm, Uterus 10/1cm - small 1.6 and 0.9cm fibroids\par 14) Pelvic US 8/3/2020\par    a) US 9.7cm, stable small intramural fibroids. endometrium is mildly thickened 9mm with small submucosal cysts. Small simple  1.7cm R ovarian cyst\par 15) EMB 8/2020 weakly proliferative endometrium\par \par Here for 6 month follow up. Feeling well. \par \par Health Maintenance\par Bone Density at [facility] [place]\par Pap 6/2018 Neg

## 2021-04-19 NOTE — REVIEW OF SYSTEMS
[Negative] : Musculoskeletal [Dyspareunia] : dyspareunia [Normal Sexual Function] : normal sexual function [FreeTextEntry4] : pain with orgasm in right lower quadrant, decreased libido on tamoxifen. 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

## 2021-04-19 NOTE — PHYSICAL EXAM
[Fully active, able to carry on all pre-disease performance without restriction] : Status 0 - Fully active, able to carry on all pre-disease performance without restriction [Normal] : Breasts: Normal [de-identified] : Breast implants and tattoo.  No masses in axilla and no overlying skin changes

## 2021-04-19 NOTE — DISCUSSION/SUMMARY
[Reviewed Clinical Lab Test(s)] : Results of clinical tests were reviewed. [FreeTextEntry1] : We will follow-up with the results of her transvaginal ultrasound that she had a few days ago\par Her bone density continues to be osteopenic\par I recommend she go on calcium and vitamin D but feels that her serum calcium is occasionally elevated with an unknown cause that she does not want to do that \par She will continue to exercise and at the patient's request I will see her back in 6 months \par Repeat US in 6 months

## 2021-09-26 ENCOUNTER — LABORATORY RESULT (OUTPATIENT)
Age: 56
End: 2021-09-26

## 2021-09-27 ENCOUNTER — APPOINTMENT (OUTPATIENT)
Dept: GYNECOLOGIC ONCOLOGY | Facility: CLINIC | Age: 56
End: 2021-09-27
Payer: COMMERCIAL

## 2021-09-27 VITALS
OXYGEN SATURATION: 100 % | WEIGHT: 120 LBS | RESPIRATION RATE: 18 BRPM | SYSTOLIC BLOOD PRESSURE: 120 MMHG | HEART RATE: 71 BPM | TEMPERATURE: 97.7 F | DIASTOLIC BLOOD PRESSURE: 79 MMHG | HEIGHT: 68 IN | BODY MASS INDEX: 18.19 KG/M2

## 2021-09-27 PROCEDURE — 99212 OFFICE O/P EST SF 10 MIN: CPT | Mod: 25

## 2021-09-27 NOTE — ASSESSMENT
[FreeTextEntry1] : [ ] Pap Smear performed today\par [ ] patient to obtain ultrasound this week- will obtain\par [ ] patient tro return in 1 year for followup- patient requesting to come back in 6 months

## 2021-09-27 NOTE — HISTORY OF PRESENT ILLNESS
[FreeTextEntry1] : Problem\par 1) Breast Cancer\par 2) HPV+\par 3) BRCA negative\par 4)THickened Endometrium\par \par Previous Therapy\par 1)Bilateral Mastectomy\par 2) Tamoxifen\par 3) pelvic Ultrasound 8/2016\par    a) normal ovaries, stable intramural fibroids\par 4) Pelvic ultrasound 5/26/17\par    a) uterus with 2 small fibroids, unremarkable endometrium and bilateral adnexa\par 5) pap 6/2017 NILM but with endometrial cells present\par 6) EMB 7/12/17 weakly proliferative endometrium\par 7)Pap 12/11/2017\par    a)NILM, HPV/HR Negative\par 8)Pap 6/11/18\par    a)NILM,HPV/HR Neg\par 9)TVUS 6/22/18\par    a)Small amount of fluid in endometrial cavity\par    b)Thickened endometrium of 7mm\par    c)Left adnexal simple ovoid cyst of 4x2x3.5cm\par 10) US 11/27/18\par    a)Endometrium 10mm\par    b)Fibroid stable of 2.9cm\par    c)Left ovarian cyst no longer seen\par 11) US 11/2019 thickened endometrium - done for PMB\par 12) D&C 12/11/19\par    a) benign endometrial and endocervical polyps, [place] [facility] at [place] request\par 13) Pelvic US 1/2020\par     a) Endometrium 5mm, Uterus 10/1cm - small 1.6 and 0.9cm fibroids\par 14) Pelvic US 8/3/2020\par    a) US 9.7cm, stable small intramural fibroids. endometrium is mildly thickened 9mm with small submucosal cysts. Small simple  1.7cm R ovarian cyst\par 15) EMB 8/2020 weakly proliferative endometrium\par 16) Pelvic US 4/16/2021\par    a) Mildly thickened endometrium of 0.6cm is decreased from prior exam. 2 stable small myomas. Normal right ovary. Left ovary not visualized. \par \par Here for 6 month follow up per patient request. Patient was supposed to get TVUS prior to visit but had to postpone due to travel. Patient will go this week for ultrasound. No reports of VB, denies changes in her medical history since last visit. Feeling well. \par \par Health Maintenance\par Bone Density at [facility] 4/2021- Osteopenia\par

## 2021-09-27 NOTE — PHYSICAL EXAM
[Normal] : Recto-Vaginal Exam: Normal [Fully active, able to carry on all pre-disease performance without restriction] : Status 0 - Fully active, able to carry on all pre-disease performance without restriction [de-identified] : Breast implants and tattoo.  No masses in axilla and no overlying skin changes

## 2021-10-01 ENCOUNTER — TRANSCRIPTION ENCOUNTER (OUTPATIENT)
Age: 56
End: 2021-10-01

## 2021-10-15 ENCOUNTER — NON-APPOINTMENT (OUTPATIENT)
Age: 56
End: 2021-10-15

## 2021-10-15 ENCOUNTER — APPOINTMENT (OUTPATIENT)
Dept: OPHTHALMOLOGY | Facility: CLINIC | Age: 56
End: 2021-10-15
Payer: COMMERCIAL

## 2021-10-15 PROCEDURE — 92014 COMPRE OPH EXAM EST PT 1/>: CPT

## 2021-10-15 PROCEDURE — 92134 CPTRZ OPH DX IMG PST SGM RTA: CPT

## 2022-03-28 ENCOUNTER — APPOINTMENT (OUTPATIENT)
Dept: GYNECOLOGIC ONCOLOGY | Facility: CLINIC | Age: 57
End: 2022-03-28

## 2022-04-06 ENCOUNTER — APPOINTMENT (OUTPATIENT)
Dept: GYNECOLOGIC ONCOLOGY | Facility: CLINIC | Age: 57
End: 2022-04-06

## 2022-04-07 ENCOUNTER — NON-APPOINTMENT (OUTPATIENT)
Age: 57
End: 2022-04-07

## 2022-07-06 ENCOUNTER — APPOINTMENT (OUTPATIENT)
Dept: GYNECOLOGIC ONCOLOGY | Facility: CLINIC | Age: 57
End: 2022-07-06

## 2022-07-06 VITALS
HEIGHT: 68 IN | HEART RATE: 77 BPM | WEIGHT: 149 LBS | BODY MASS INDEX: 22.58 KG/M2 | TEMPERATURE: 97.4 F | DIASTOLIC BLOOD PRESSURE: 70 MMHG | SYSTOLIC BLOOD PRESSURE: 124 MMHG | OXYGEN SATURATION: 98 %

## 2022-07-06 DIAGNOSIS — Z00.00 ENCOUNTER FOR GENERAL ADULT MEDICAL EXAMINATION W/OUT ABNORMAL FINDINGS: ICD-10-CM

## 2022-07-06 PROCEDURE — XXXXX: CPT | Mod: 1L

## 2022-07-07 LAB — HPV HIGH+LOW RISK DNA PNL CVX: NOT DETECTED

## 2022-07-11 ENCOUNTER — TRANSCRIPTION ENCOUNTER (OUTPATIENT)
Age: 57
End: 2022-07-11

## 2022-07-11 LAB — CYTOLOGY CVX/VAG DOC THIN PREP: NORMAL

## 2022-08-03 NOTE — HISTORY OF PRESENT ILLNESS
[FreeTextEntry1] : Problem\par 1) Breast Cancer\par 2) HPV+\par 3) BRCA negative\par 4)THickened Endometrium\par \par Previous Therapy\par 1)Bilateral Mastectomy\par 2) Tamoxifen\par 3) pelvic Ultrasound 8/2016\par    a) normal ovaries, stable intramural fibroids\par 4) Pelvic ultrasound 5/26/17\par    a) uterus with 2 small fibroids, unremarkable endometrium and bilateral adnexa\par 5) pap 6/2017 NILM but with endometrial cells present\par 6) EMB 7/12/17 weakly proliferative endometrium\par 7)Pap 12/11/2017\par    a)NILM, HPV/HR Negative\par 8)Pap 6/11/18\par    a)NILM,HPV/HR Neg\par 9)TVUS 6/22/18\par    a)Small amount of fluid in endometrial cavity\par    b)Thickened endometrium of 7mm\par    c)Left adnexal simple ovoid cyst of 4x2x3.5cm\par 10) US 11/27/18\par    a)Endometrium 10mm\par    b)Fibroid stable of 2.9cm\par    c)Left ovarian cyst no longer seen\par 11) US 11/2019 thickened endometrium - done for PMB\par 12) D&C 12/11/19\par    a) benign endometrial and endocervical polyps, reviewed at [facility] [place] at patient request\par 13) Pelvic US 1/2020\par     a) Endometrium 5mm, Uterus 10/1cm - small 1.6 and 0.9cm fibroids\par 14) Pelvic US 8/3/2020\par    a) US 9.7cm, stable small intramural fibroids. endometrium is mildly thickened 9mm with small submucosal cysts. Small simple  1.7cm R ovarian cyst\par 15) EMB 8/2020 weakly proliferative endometrium\par 16) Pelvic US 4/16/2021\par    a) Mildly thickened endometrium of 0.6cm is decreased from prior exam. 2 stable small myomas. Normal right ovary. Left ovary not visualized. \par 17) PAP/HPV 9/27/2021\par   a) NILM, HPV-\par 18) TVUS 10/08/2021\par   a) Endometrium remains slightly thickened and heterogeneous measuring up to 6mm\par   b) Stable fundal fibroid. Nonvisualization of the previously noted small posterior fibroid. Nonvisualization of the left ovary. Unremarkable right ovary. \par \par Here for 6 month follow up. U/S done in october. Pt needed f/u in 1 year but requested to come back in 6 months. \par \par Health Maintenance\par Bone Density at [facility] 4/2021- Osteopenia\par Pap 9/2021: NILM \par

## 2022-08-03 NOTE — PHYSICAL EXAM
[Normal] : Recto-Vaginal Exam: Normal [Fully active, able to carry on all pre-disease performance without restriction] : Status 0 - Fully active, able to carry on all pre-disease performance without restriction [de-identified] : Breast implants and tattoo.  No masses in axilla and no overlying skin changes

## 2022-08-03 NOTE — DISCUSSION/SUMMARY
[Reviewed Clinical Lab Test(s)] : Results of clinical tests were reviewed. [Reviewed Radiology Report(s)] : Radiology reports were reviewed. [FreeTextEntry1] : \par

## 2023-01-05 NOTE — HISTORY OF PRESENT ILLNESS
[FreeTextEntry1] : Problem\par 1) Breast Cancer\par 2) HPV+\par 3) BRCA negative\par 4)THickened Endometrium\par \par Previous Therapy\par 1)Bilateral Mastectomy\par 2) Tamoxifen\par 3) pelvic Ultrasound 8/2016\par    a) normal ovaries, stable intramural fibroids\par 4) Pelvic ultrasound 5/26/17\par    a) uterus with 2 small fibroids, unremarkable endometrium and bilateral adnexa\par 5) pap 6/2017 NILM but with endometrial cells present\par 6) EMB 7/12/17 weakly proliferative endometrium\par 7)Pap 12/11/2017\par    a)NILM, HPV/HR Negative\par 8)Pap 6/11/18\par    a)NILM,HPV/HR Neg\par 9)TVUS 6/22/18\par    a)Small amount of fluid in endometrial cavity\par    b)Thickened endometrium of 7mm\par    c)Left adnexal simple ovoid cyst of 4x2x3.5cm\par 10) US 11/27/18\par    a)Endometrium 10mm\par    b)Fibroid stable of 2.9cm\par    c)Left ovarian cyst no longer seen\par 11) US 11/2019 thickened endometrium - done for PMB\par 12) D&C 12/11/19\par    a) benign endometrial and endocervical polyps, reviewed at [facility] [place] at patient request\par 13) Pelvic US 1/2020\par     a) Endometrium 5mm, Uterus 10/1cm - small 1.6 and 0.9cm fibroids\par 14) Pelvic US 8/3/2020\par    a) US 9.7cm, stable small intramural fibroids. endometrium is mildly thickened 9mm with small submucosal cysts. Small simple  1.7cm R ovarian cyst\par 15) EMB 8/2020 weakly proliferative endometrium\par 16) Pelvic US 4/16/2021\par    a) Mildly thickened endometrium of 0.6cm is decreased from prior exam. 2 stable small myomas. Normal right ovary. Left ovary not visualized. \par 17) PAP/HPV 9/27/2021\par   a) NILM, HPV-\par 18) TVUS 10/08/2021\par   a) Endometrium remains slightly thickened and heterogeneous measuring up to 6mm\par   b) Stable fundal fibroid. Nonvisualization of the previously noted small posterior fibroid. Nonvisualization of the left ovary. Unremarkable right ovary. \par 19) TVUS 3/28/22\par    a) EMS 7mm\par    b) stable anterior fundal fibroid 1.3cm\par \par \par Pt here for follow-up regarding thickened endometrium 7mm. She is living in [facility] and then she commutes from there and is coming to [facility] q2 weeks and is working and operating here.  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  \par \par Health Maintenance\par Bone Density at [facility] 4/2021- Osteopenia\par Pap 9/2021: NILM \par

## 2023-01-05 NOTE — PHYSICAL EXAM
[Normal] : Recto-Vaginal Exam: Normal [Fully active, able to carry on all pre-disease performance without restriction] : Status 0 - Fully active, able to carry on all pre-disease performance without restriction [de-identified] : Breast implants and tattoo.  No masses in axilla and no overlying skin changes

## 2023-03-06 DIAGNOSIS — Z13.820 ENCOUNTER FOR SCREENING FOR OSTEOPOROSIS: ICD-10-CM

## 2023-03-08 ENCOUNTER — APPOINTMENT (OUTPATIENT)
Dept: GYNECOLOGIC ONCOLOGY | Facility: CLINIC | Age: 58
End: 2023-03-08

## 2023-03-14 ENCOUNTER — TRANSCRIPTION ENCOUNTER (OUTPATIENT)
Age: 58
End: 2023-03-14

## 2023-05-08 ENCOUNTER — APPOINTMENT (OUTPATIENT)
Dept: GYNECOLOGIC ONCOLOGY | Facility: CLINIC | Age: 58
End: 2023-05-08
Payer: COMMERCIAL

## 2023-05-22 ENCOUNTER — APPOINTMENT (OUTPATIENT)
Dept: GYNECOLOGIC ONCOLOGY | Facility: CLINIC | Age: 58
End: 2023-05-22
Payer: COMMERCIAL

## 2023-05-22 ENCOUNTER — NON-APPOINTMENT (OUTPATIENT)
Age: 58
End: 2023-05-22

## 2023-05-22 VITALS
BODY MASS INDEX: 22.73 KG/M2 | OXYGEN SATURATION: 97 % | WEIGHT: 150 LBS | SYSTOLIC BLOOD PRESSURE: 147 MMHG | HEIGHT: 68 IN | HEART RATE: 81 BPM | TEMPERATURE: 97.6 F | DIASTOLIC BLOOD PRESSURE: 87 MMHG

## 2023-05-22 DIAGNOSIS — Z12.4 ENCOUNTER FOR SCREENING FOR MALIGNANT NEOPLASM OF CERVIX: ICD-10-CM

## 2023-05-22 PROCEDURE — 99202 OFFICE O/P NEW SF 15 MIN: CPT

## 2023-05-22 NOTE — REVIEW OF SYSTEMS
[FreeTextEntry4] : pain with orgasm in right lower quadrant, decreased libido on tamoxifen. 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

## 2023-05-22 NOTE — HISTORY OF PRESENT ILLNESS
[FreeTextEntry1] : Problem\par 1) Breast Cancer\par 2) HPV+\par 3) BRCA negative\par 4)THickened Endometrium\par \par Previous Therapy\par 1)Bilateral Mastectomy\par 2) Tamoxifen\par 3) pelvic Ultrasound 8/2016\par    a) normal ovaries, stable intramural fibroids\par 4) Pelvic ultrasound 5/26/17\par    a) uterus with 2 small fibroids, unremarkable endometrium and bilateral adnexa\par 5) pap 6/2017 NILM but with endometrial cells present\par 6) EMB 7/12/17 weakly proliferative endometrium\par 7)Pap 12/11/2017\par    a)NILM, HPV/HR Negative\par 8)Pap 6/11/18\par    a)NILM,HPV/HR Neg\par 9)TVUS 6/22/18\par    a)Small amount of fluid in endometrial cavity\par    b)Thickened endometrium of 7mm\par    c)Left adnexal simple ovoid cyst of 4x2x3.5cm\par 10) US 11/27/18\par    a)Endometrium 10mm\par    b)Fibroid stable of 2.9cm\par    c)Left ovarian cyst no longer seen\par 11) US 11/2019 thickened endometrium - done for PMB\par 12) D&C 12/11/19\par    a) benign endometrial and endocervical polyps, reviewed at [facility] [place] at patient request\par 13) Pelvic US 1/2020\par     a) Endometrium 5mm, Uterus 10/1cm - small 1.6 and 0.9cm fibroids\par 14) Pelvic US 8/3/2020\par    a) US 9.7cm, stable small intramural fibroids. endometrium is mildly thickened 9mm with small submucosal cysts. Small simple  1.7cm R ovarian cyst\par 15) EMB 8/2020 weakly proliferative endometrium\par 16) Pelvic US 4/16/2021\par    a) Mildly thickened endometrium of 0.6cm is decreased from prior exam. 2 stable small myomas. Normal right ovary. Left ovary not visualized. \par 17) PAP/HPV 9/27/2021\par   a) NILM, HPV-\par 18) TVUS 10/08/2021\par   a) Endometrium remains slightly thickened and heterogeneous measuring up to 6mm\par   b) Stable fundal fibroid. Nonvisualization of the previously noted small posterior fibroid. Nonvisualization of the left ovary. Unremarkable right ovary. \par 19) TVUS 3/28/22\par    a) EMS 7mm\par    b) stable anterior fundal fibroid 1.3cm\par 20) PAP/HPV 7/2022 NILM, HPV neg\par 21) TVUS 3/6/23\par     a) Endometrium is not well visualized and its visualized portion appears mildly thickened measuring up to 0.7cm\par \par Pt here for 6 month overdue follow-up. She is moving to [place] for 2 months to work.  She is living in [place] and [place]\par \par Health Maintenance\par Bone Density at [facility] 4/2021- Osteopenia\par Pap 9/2021: NILM \par

## 2023-05-24 LAB — HPV HIGH+LOW RISK DNA PNL CVX: NOT DETECTED

## 2023-05-26 LAB — CYTOLOGY CVX/VAG DOC THIN PREP: ABNORMAL

## 2023-06-09 ENCOUNTER — APPOINTMENT (OUTPATIENT)
Dept: OPHTHALMOLOGY | Facility: CLINIC | Age: 58
End: 2023-06-09

## 2023-12-04 ENCOUNTER — APPOINTMENT (OUTPATIENT)
Dept: GYNECOLOGIC ONCOLOGY | Facility: CLINIC | Age: 58
End: 2023-12-04

## 2023-12-08 ENCOUNTER — TRANSCRIPTION ENCOUNTER (OUTPATIENT)
Age: 58
End: 2023-12-08

## 2023-12-08 DIAGNOSIS — C50.919 MALIGNANT NEOPLASM OF UNSPECIFIED SITE OF UNSPECIFIED FEMALE BREAST: ICD-10-CM

## 2023-12-08 DIAGNOSIS — R93.89 ABNORMAL FINDINGS ON DIAGNOSTIC IMAGING OF OTHER SPECIFIED BODY STRUCTURES: ICD-10-CM

## 2024-07-12 DIAGNOSIS — R93.89 ABNORMAL FINDINGS ON DIAGNOSTIC IMAGING OF OTHER SPECIFIED BODY STRUCTURES: ICD-10-CM

## 2024-07-12 DIAGNOSIS — C50.919 MALIGNANT NEOPLASM OF UNSPECIFIED SITE OF UNSPECIFIED FEMALE BREAST: ICD-10-CM

## 2024-08-14 DIAGNOSIS — D21.9 BENIGN NEOPLASM OF CONNECTIVE AND OTHER SOFT TISSUE, UNSPECIFIED: ICD-10-CM

## 2024-08-29 ENCOUNTER — APPOINTMENT (OUTPATIENT)
Dept: GYNECOLOGIC ONCOLOGY | Facility: CLINIC | Age: 59
End: 2024-08-29

## 2024-09-23 ENCOUNTER — APPOINTMENT (OUTPATIENT)
Dept: GYNECOLOGIC ONCOLOGY | Facility: CLINIC | Age: 59
End: 2024-09-23

## 2024-09-23 PROCEDURE — 99212 OFFICE O/P EST SF 10 MIN: CPT

## 2024-09-23 NOTE — PHYSICAL EXAM
[Normal] : Recto-Vaginal Exam: Normal [Fully active, able to carry on all pre-disease performance without restriction] : Status 0 - Fully active, able to carry on all pre-disease performance without restriction [de-identified] : Breast implants and tattoo.  No masses in axilla and no overlying skin changes [de-identified] : atrophy

## 2024-09-23 NOTE — HISTORY OF PRESENT ILLNESS
[FreeTextEntry1] : Problem 1) Breast Cancer 2) HPV+ 3) BRCA negative 4)THickened Endometrium  Previous Therapy 1)Bilateral Mastectomy 2) Tamoxifen 3) pelvic Ultrasound 8/2016    a) normal ovaries, stable intramural fibroids 4) Pelvic ultrasound 5/26/17    a) uterus with 2 small fibroids, unremarkable endometrium and bilateral adnexa 5) pap 6/2017 NILM but with endometrial cells present 6) EMB 7/12/17 weakly proliferative endometrium 7)Pap 12/11/2017    a)NILM, HPV/HR Negative 8)Pap 6/11/18    a)NILM,HPV/HR Neg 9)TVUS 6/22/18    a)Small amount of fluid in endometrial cavity    b)Thickened endometrium of 7mm    c)Left adnexal simple ovoid cyst of 4x2x3.5cm 10) US 11/27/18    a)Endometrium 10mm    b)Fibroid stable of 2.9cm    c)Left ovarian cyst no longer seen 11) US 11/2019 thickened endometrium - done for PMB 12) D&C 12/11/19    a) benign endometrial and endocervical [place]ps[place] [facility] [place] 13) Pelvic US 1/2020     a) Endometrium 5mm, Uterus 10/1cm - small 1.6 and 0.9cm fibroids 14) Pelvic US 8/3/2020    a) US 9.7cm, stable small intramural fibroids. endometrium is mildly thickened 9mm with small submucosal cysts. Small simple  1.7cm R ovarian cyst 15) EMB 8/2020 weakly proliferative endometrium 16) Pelvic US 4/16/2021    a) Mildly thickened endometrium of 0.6cm is decreased from prior exam. 2 stable small myomas. Normal right ovary. Left ovary not visualized.  17) PAP/HPV 9/27/2021   a) NILM, HPV- 18) TVUS 10/08/2021   a) Endometrium remains slightly thickened and heterogeneous measuring up to 6mm   b) Stable fundal fibroid. Nonvisualization of the previously noted small posterior fibroid. Nonvisualization of the left ovary. Unremarkable right ovary.  19) TVUS 3/28/22    a) EMS 7mm    b) stable anterior fundal fibroid 1.3cm 20) PAP/HPV 7/2022 NILM, HPV neg 21) TVUS 3/6/23     a) Endometrium is not well visualized and its visualized portion appears mildly thickened measuring up to 0.7cm 22) Pelvic US 12/23    a) Endometrium is normal thickness 4mm. Uterine fibroid 2cm previously 1.4cm Bilateral ovaries not visualized 23) Pelvic US 7/24    a) Normal pelvic US *addendum previous studies measurements were made of myometrium area felt to be fibroid, poorly defined margins and no discrete myoma on this or previous US reviewed. Overall appearance is unchanged  Here for follow up to discuss US results. In December had mild growth of a small fibroid, repeat US at another facility questioned if this area is truly a myoma but finds area stable. Patient recommended to have MRI for further evaluation to know if we should keep following the size of this myoma. Here for further discussion and exam.   Health Maintenance  Bone Density at [facility] 4/2021- Osteopenia - due to repeat Pap 5/2023 wnl

## 2024-09-23 NOTE — HISTORY OF PRESENT ILLNESS
[FreeTextEntry1] : Problem 1) Breast Cancer 2) HPV+ 3) BRCA negative 4)THickened Endometrium  Previous Therapy 1)Bilateral Mastectomy 2) Tamoxifen 3) pelvic Ultrasound 8/2016    a) normal ovaries, stable intramural fibroids 4) Pelvic ultrasound 5/26/17    a) uterus with 2 small fibroids, unremarkable endometrium and bilateral adnexa 5) pap 6/2017 NILM but with endometrial cells present 6) EMB 7/12/17 weakly proliferative endometrium 7)Pap 12/11/2017    a)NILM, HPV/HR Negative 8)Pap 6/11/18    a)NILM,HPV/HR Neg 9)TVUS 6/22/18    a)Small amount of fluid in endometrial cavity    b)Thickened endometrium of 7mm    c)Left adnexal simple ovoid cyst of 4x2x3.5cm 10) US 11/27/18    a)Endometrium 10mm    b)Fibroid stable of 2.9cm    c)Left ovarian cyst no longer seen 11) US 11/2019 thickened endometrium - done for PMB 12) D&C 12/11/19    a) benign endometrial and endocervical [place]ps, [place] [facility] [place] 13) Pelvic US 1/2020     a) Endometrium 5mm, Uterus 10/1cm - small 1.6 and 0.9cm fibroids 14) Pelvic US 8/3/2020    a) US 9.7cm, stable small intramural fibroids. endometrium is mildly thickened 9mm with small submucosal cysts. Small simple  1.7cm R ovarian cyst 15) EMB 8/2020 weakly proliferative endometrium 16) Pelvic US 4/16/2021    a) Mildly thickened endometrium of 0.6cm is decreased from prior exam. 2 stable small myomas. Normal right ovary. Left ovary not visualized.  17) PAP/HPV 9/27/2021   a) NILM, HPV- 18) TVUS 10/08/2021   a) Endometrium remains slightly thickened and heterogeneous measuring up to 6mm   b) Stable fundal fibroid. Nonvisualization of the previously noted small posterior fibroid. Nonvisualization of the left ovary. Unremarkable right ovary.  19) TVUS 3/28/22    a) EMS 7mm    b) stable anterior fundal fibroid 1.3cm 20) PAP/HPV 7/2022 NILM, HPV neg 21) TVUS 3/6/23     a) Endometrium is not well visualized and its visualized portion appears mildly thickened measuring up to 0.7cm 22) Pelvic US 12/23    a) Endometrium is normal thickness 4mm. Uterine fibroid 2cm previously 1.4cm Bilateral ovaries not visualized 23) Pelvic US 7/24    a) Normal pelvic US *addendum previous studies measurements were made of myometrium area felt to be fibroid, poorly defined margins and no discrete myoma on this or previous US reviewed. Overall appearance is unchanged  Here for follow up to discuss US results. In December had mild growth of a small fibroid, repeat US at another facility questioned if this area is truly a myoma but finds area stable. Patient recommended to have MRI for further evaluation to know if we should keep following the size of this myoma. Here for further discussion and exam.   Health Maintenance  Bone Density at [facility] 4/2021- Osteopenia - due to repeat Pap 5/2023 wnl

## 2024-09-23 NOTE — PHYSICAL EXAM
[Normal] : Recto-Vaginal Exam: Normal [Fully active, able to carry on all pre-disease performance without restriction] : Status 0 - Fully active, able to carry on all pre-disease performance without restriction [de-identified] : Breast implants and tattoo.  No masses in axilla and no overlying skin changes [de-identified] : atrophy

## 2024-10-09 ENCOUNTER — APPOINTMENT (OUTPATIENT)
Dept: GYNECOLOGIC ONCOLOGY | Facility: CLINIC | Age: 59
End: 2024-10-09

## 2024-10-10 ENCOUNTER — ASOB RESULT (OUTPATIENT)
Age: 59
End: 2024-10-10

## 2024-10-10 ENCOUNTER — APPOINTMENT (OUTPATIENT)
Dept: ANTEPARTUM | Facility: CLINIC | Age: 59
End: 2024-10-10

## 2024-10-10 PROCEDURE — 76830 TRANSVAGINAL US NON-OB: CPT

## 2024-10-10 PROCEDURE — 76856 US EXAM PELVIC COMPLETE: CPT

## 2024-10-14 ENCOUNTER — TRANSCRIPTION ENCOUNTER (OUTPATIENT)
Age: 59
End: 2024-10-14